# Patient Record
(demographics unavailable — no encounter records)

---

## 2024-10-26 NOTE — PHYSICAL EXAM
[No Acute Distress] : no acute distress [No Respiratory Distress] : no respiratory distress  [Normal Rate] : normal rate  [Regular Rhythm] : with a regular rhythm [Normal S1, S2] : normal S1 and S2 [No Edema] : there was no peripheral edema

## 2024-10-26 NOTE — HISTORY OF PRESENT ILLNESS
[FreeTextEntry1] : Follow-up status post hospital visit [de-identified] : Patient presents for follow-up status post [facility] admission 3 weeks ago was diagnosed with coronary artery disease was experiencing chest pain undergone coronary angiogram which revealed stenosis 50% in one of the coronary arteries unsure which 1 did not bring discharge papers currently asymptomatic was started on statin and aspirin losartan and amlodipine.  Patient is not sure if he needs to blood pressure medications.  Previously was not taking losartan 50 on a regular basis

## 2024-10-26 NOTE — ASSESSMENT
[FreeTextEntry1] : Coronary artery disease nonobstructive no stent continue medical therapy follow-up cardiology.  Asymptomatic Hyperlipidemia on statin.  Will repeat LFTs fasting lipids next visit Hypertension advised patient to hold amlodipine 5 mg stable losartan 100 keep home BP diary if more than 130/80 will restart amlodipine Obesity discussed with the patient there is significant risk factor for cardiovascular events advised weight reduction Follow-up 2 weeks

## 2024-11-09 NOTE — HISTORY OF PRESENT ILLNESS
[FreeTextEntry1] : Follow-up [de-identified] : Patient presents for follow-up hypertension coronary artery disease prediabetes obesity Hypertension currently taking losartan 100 mg BP still elevated at home and in the office no acute complaints

## 2024-11-09 NOTE — ASSESSMENT
[FreeTextEntry1] : Coronary artery disease continue current regiment optimize BP follow-up cardiology Hypertension not at goal continue losartan and amlodipine 5 mg keep home BP diary Prediabetes check hemoglobin A1c low carbohydrate diet advised Elevated LDL check fasting lipids continue statin Follow-up 2 weeks

## 2025-01-02 NOTE — PHYSICAL EXAM
[No Acute Distress] : no acute distress [Normal] : no respiratory distress, lungs were clear to auscultation bilaterally and no accessory muscle use

## 2025-01-02 NOTE — HISTORY OF PRESENT ILLNESS
[FreeTextEntry1] : Cough [de-identified] : Patient presents complaining of 2 weeks history of cough moderate persistent with copious yellow discharge.  Denies chest pain shortness of breath fever no improvement with over-the-counter treatment NyQuil DayQuil

## 2025-01-02 NOTE — ASSESSMENT
[FreeTextEntry1] : Bronchitis Z-Elijah promethazine Ventolin.  Unlikely will benefit from respiratory viral panel testing given 2 weeks into symptoms.  If no improvement will proceed with chest x-ray patient is aware we will call Hypertension elevated today however likely triggered by cough.  Home readings less than 130/80 Continue current regimen Follow-up 1 week

## 2025-01-30 NOTE — ASSESSMENT
[FreeTextEntry1] : Acute sinusitis start Augmentin continue saline Flonase Follow-up 2 weeks as needed

## 2025-01-30 NOTE — PHYSICAL EXAM
[Normal] : no respiratory distress, lungs were clear to auscultation bilaterally and no accessory muscle use [de-identified] : Edema nasal mucosa bilaterally yellow nasal discharge bilaterally

## 2025-01-30 NOTE — HISTORY OF PRESENT ILLNESS
[FreeTextEntry1] : Cough nasal congestion [de-identified] : Patient presents complaining of nasal congestion dry cough facial pressure green nasal discharge throughout the day status post URI 2 weeks ago.  Denies chest pain shortness of breath fever.  No improvement with over-the-counter regimen

## 2025-01-30 NOTE — HISTORY OF PRESENT ILLNESS
[FreeTextEntry1] : Cough nasal congestion [de-identified] : Patient presents complaining of nasal congestion dry cough facial pressure green nasal discharge throughout the day status post URI 2 weeks ago.  Denies chest pain shortness of breath fever.  No improvement with over-the-counter regimen

## 2025-01-30 NOTE — PHYSICAL EXAM
[Normal] : no respiratory distress, lungs were clear to auscultation bilaterally and no accessory muscle use [de-identified] : Edema nasal mucosa bilaterally yellow nasal discharge bilaterally

## 2025-02-01 NOTE — HISTORY OF PRESENT ILLNESS
[FreeTextEntry1] : Follow-up cough [de-identified] : Patient presents for follow-up cough sinusitis.  Still has 2 days left of Augmentin significant improvement 70% still has intermittent cough dry worse when laying down.  Patient is not using saline

## 2025-02-01 NOTE — ASSESSMENT
[FreeTextEntry1] : Sinusitis resolving.  Finish Augmentin encouraged to use saline Follow-up 3 months